# Patient Record
Sex: FEMALE | Race: WHITE
[De-identification: names, ages, dates, MRNs, and addresses within clinical notes are randomized per-mention and may not be internally consistent; named-entity substitution may affect disease eponyms.]

---

## 2017-03-14 NOTE — BD
EXAMINATION TYPE: MG DEXA axial skeleton.  

 

DATE OF EXAM: 3/14/2017 7:06 AM

 

CLINICAL HISTORY: Fracture of one rib per order.

 

Height:  66.5

Weight:  282

 

FRAX RISK QUESTIONS:

Alcohol (3 or more units per day):  no

Family History (Parent hip fracture):  no

Glucocorticoids (More than 3mos):  no

           (Ex: prednisone, prednisolone, methylprednisolone, dexamethasone, and hydrocortisone).    
     

History of Fracture in Adulthood: rib

Secondary Osteoporosis: 

  1.  Type 1 Diabetes: no

  2.  Hyperthyroidism: no

  3.  Menopause before 45: no

  4.  Malnutrition: no

  5.  Chronic liver disease: no

Rheumatoid Arthritis: no

Current Tobacco Use: no

 

RISK FACTORS 

HISTORY OF: 

History of Fracture: yes, rib 

When: unsure; recently detected on recent CT scan

Family History of Osteoporosis: yes, mother

Drink Alcohol: no

Active: yes

Diet low in dairy products/other sources of calcium:  somewhat

Postmenopausal woman: yes, hysterectomy age 45

Take estrogen and/or progesterone medications: no

Lost more than 2 inches in height since high school: no

Frequent falls: no

Poor Health: somewhat

Hyperparathyroidism: no

Adrenal Insufficiency: no

 

MEDICATIONS: 

Prednisone or other steroids: no

Thyroid Medications:  no

Osteoporosis Medications: no

Additional Medications: blood pressure meds, multi Vitamin 

Additional History: Ovarian CA, chemo 

body aches 

 

EXAM MEASUREMENTS: 

Bone mineral densitometry was performed using the [employer] System.

Bone mineral density as measured about the Lumbar spine is:  

----- L1-L4(G/cm2): 1.001

T Score Values are as follows:

----- L2: -2.2

----- L3: -0.7

----- L4: -1.8

----- L1-L4: -1.5

Bone mineral density BASELINE

 

Bone mineral density about the R hip (g/cm2): 1.103

Bone mineral density about the L hip (g/cm2): 1.055

T Score values are as follows:

-----R Neck: 0.5

-----L Neck: 0.1

-----R Intertrochanter: 1.1

-----L Intertrochanter: 0.7

Bone mineral density BASELINE

 

IMPRESSION:

Osteopenia (T Score between -2.5 and -1 as noted by T score values overall in the low back. There is 
slightly increased risk of fracture and the patient may be considered for treatment. Re-Screen 1-2 ye
ars.

 

NOTE:  T-SCORE=SD OF THE YOUNG ADULT MEAN.

## 2018-09-11 ENCOUNTER — HOSPITAL ENCOUNTER (OUTPATIENT)
Dept: HOSPITAL 47 - RADXRMAIN | Age: 48
Discharge: HOME | End: 2018-09-11
Payer: COMMERCIAL

## 2018-09-11 DIAGNOSIS — M79.604: Primary | ICD-10-CM

## 2018-09-11 DIAGNOSIS — M25.561: ICD-10-CM

## 2018-09-12 NOTE — XR
EXAMINATION TYPE: XR knee complete RT

 

DATE OF EXAM: 9/11/2018

 

CLINICAL HISTORY: pain

 

TECHNIQUE:  Three views of the right knee are obtained.

 

COMPARISON: None.

 

FINDINGS:  There is no acute fracture/dislocation.  The tri-compartment joint spaces appear within no
rmal limits.  The overlying soft tissue appears unremarkable.

 

IMPRESSION:  There is no acute fracture or dislocation.ICD 10 NO FRACTURE, INITIAL EVALUATION

## 2018-09-12 NOTE — XR
EXAMINATION TYPE: XR femur RT

 

DATE OF EXAM: 9/11/2018

 

CLINICAL HISTORY: pain

 

TECHNIQUE:  Two views of the right femur are obtained.

 

COMPARISON: None.

 

FINDINGS:  

There is no acute fracture or dislocation seen of the  femur.  The hip and knee joints appear within 
normal limits.  The overlying soft tissue appears unremarkable.

 

IMPRESSION:  

 

There is no acute fracture or dislocation seen of the femur.

 

ICD 10 NO FRACTURE, INITIAL EVALUATION

## 2018-09-12 NOTE — XR
EXAMINATION TYPE: XR tibia fibula RT

 

DATE OF EXAM: 9/11/2018

 

CLINICAL HISTORY: pain

 

TECHNIQUE:  AP and lateral images of the right tibia and fibula are obtained.

 

COMPARISON: None.

 

FINDINGS:  There is no acute fracture/dislocation evident. The joint spaces  appear within normal mccloud
its.  The overlying soft tissue appears unremarkable.

 

IMPRESSION:  

 

There is no acute fracture or dislocation seen.

 

ICD 10 NO FRACTURE, INITIAL EVALUATION

## 2018-09-24 ENCOUNTER — HOSPITAL ENCOUNTER (OUTPATIENT)
Dept: HOSPITAL 47 - RADMAMWWP | Age: 48
Discharge: HOME | End: 2018-09-24
Payer: COMMERCIAL

## 2018-09-24 DIAGNOSIS — Z15.01: ICD-10-CM

## 2018-09-24 DIAGNOSIS — Z12.31: Primary | ICD-10-CM

## 2018-09-24 PROCEDURE — 77066 DX MAMMO INCL CAD BI: CPT

## 2018-09-24 PROCEDURE — 77062 BREAST TOMOSYNTHESIS BI: CPT

## 2018-09-24 NOTE — MM
Reason for exam: additional evaluation requested from prior study.

Last mammogram was performed 1 year and 11 months ago.



History:

Patient is postmenopausal.

Family history of breast cancer in paternal grandmother.



Physical Findings:

Nurse did not find any significant physical abnormalities on exam.



MG 3D Diag Mammo W/Cad [facility]AMA

Bilateral CC and MLO view(s) were taken.

Prior study comparison: October 11, 2016, bilateral MG 3d diag mammo w/cad [facility].  

February 11, 2014, mammogram, performed at [facility].

The breast tissue is heterogeneously dense. This may lower the sensitivity of 

mammography.  There is no dominant lesion. Right mediport catheter axillary 

region.



These results were verbally communicated with the patient and result sheet given 

to the patient on 9/24/18.





ASSESSMENT: Benign, BI-RAD 2



RECOMMENDATION:

Routine screening mammogram of both breasts in 1 year.

## 2018-10-23 ENCOUNTER — HOSPITAL ENCOUNTER (OUTPATIENT)
Dept: HOSPITAL 47 - ORWHC2ENDO | Age: 48
Discharge: HOME | End: 2018-10-23
Attending: SURGERY
Payer: COMMERCIAL

## 2018-10-23 VITALS — DIASTOLIC BLOOD PRESSURE: 75 MMHG | SYSTOLIC BLOOD PRESSURE: 114 MMHG | RESPIRATION RATE: 16 BRPM | HEART RATE: 81 BPM

## 2018-10-23 VITALS — BODY MASS INDEX: 45.1 KG/M2

## 2018-10-23 VITALS — TEMPERATURE: 98.2 F

## 2018-10-23 DIAGNOSIS — Z88.5: ICD-10-CM

## 2018-10-23 DIAGNOSIS — Z79.899: ICD-10-CM

## 2018-10-23 DIAGNOSIS — Z88.6: ICD-10-CM

## 2018-10-23 DIAGNOSIS — Z87.891: ICD-10-CM

## 2018-10-23 DIAGNOSIS — M19.90: ICD-10-CM

## 2018-10-23 DIAGNOSIS — Z85.43: ICD-10-CM

## 2018-10-23 DIAGNOSIS — R18.8: ICD-10-CM

## 2018-10-23 DIAGNOSIS — K21.9: ICD-10-CM

## 2018-10-23 DIAGNOSIS — E66.9: ICD-10-CM

## 2018-10-23 DIAGNOSIS — Z90.49: ICD-10-CM

## 2018-10-23 DIAGNOSIS — Z12.11: Primary | ICD-10-CM

## 2018-10-23 DIAGNOSIS — G43.909: ICD-10-CM

## 2018-10-23 DIAGNOSIS — I10: ICD-10-CM

## 2018-10-23 PROCEDURE — 45378 DIAGNOSTIC COLONOSCOPY: CPT

## 2018-10-23 NOTE — P.OP
Date of Procedure: 10/23/18


Preoperative Diagnosis: 


Screening colonoscopy


Postoperative Diagnosis: 


Normal colon


Procedure(s) Performed: 


Colonoscopy


Anesthesia: MAC


Surgeon: Seferino Youngblood


Pathology: none sent


Condition: stable


Disposition: PACU


Description of Procedure: 





PROCEDURE:  The patient was placed on the endoscopy table in the lateral 

position.  Digital rectal examination was performed which revealed no 

abnormalities.  .  Flexible colonoscope was then placed in the patient's anus 

and passed throughout the entire colon.  The ileocecal valve was visualized.  

The cecum, ascending, transverse, descending and sigmoid colon were normal.  

The rectum was normal as well.  There were no masses, polyps or diverticula 

noted in the entire colon. 





SUMMARY OF FINDINGS:  


Normal colonoscopy.

## 2018-10-23 NOTE — P.GSHP
History of Present Illness


H&P Date: 10/23/18


Chief Complaint: Screening colonoscopy





This a 48-year-old female who presents today for screening colonoscopy.  She 

denies any significant GI complaints.





Past Medical History


Past Medical History: Cancer, GERD/Reflux, Hypertension, Osteoarthritis (OA)


Additional Past Medical History / Comment(s): ovarian cancer,MIGRAINE HEADACHE


History of Any Multi-Drug Resistant Organisms: None Reported


Past Surgical History: Appendectomy, Bowel Resection,  Section, 

Cholecystectomy, Hernia Repair, Hysterectomy


Additional Past Surgical History / Comment(s): Open appendectomy for perforated 

appendeX,


Past Anesthesia/Blood Transfusion Reactions: Previous Problems w/ Anesthesia, 

Postoperative Nausea & Vomiting (PONV)


Additional Past Anesthesia/Blood Transfusion Reaction / Comment(s): BLOOD 

TRANSFUSION WITH NO PROBLEM


Smoking Status: Former smoker





- Past Family History


  ** Father


Additional Family Medical History / Comment(s): gastric ulcers





  ** Mother


Family Medical History: Cancer


Additional Family Medical History / Comment(s): TONGUE AND THROAT CANCER





Medications and Allergies


 Home Medications











 Medication  Instructions  Recorded  Confirmed  Type


 


Multivit with Calcium,Iron,Min 1 tab PO DAILY 07/27/15 10/22/18 History





[Women's Daily Multivitamin]    


 


Carvedilol [Coreg] 25 mg PO BID 02/03/16 10/22/18 History


 


Ibuprofen [Motrin] 800 mg PO Q6HR PRN 10/22/18 10/22/18 History


 


Losartan/Hydrochlorothiazide 1 each PO DAILY 10/22/18 10/22/18 History





[Losartan-Hctz 50-12.5 mg Tab]    


 


Mirabegron [Myrbetriq] 25 mg PO HS 10/22/18 10/22/18 History


 


Venlafaxine HCl [Effexor XR] 150 mg PO DAILY 10/22/18 10/22/18 History











 Allergies











Allergy/AdvReac Type Severity Reaction Status Date / Time


 


acetaminophen [From Tylenol] AdvReac  SEVERE Verified 10/22/18 08:56





   HEADACHE  


 


hydrocodone [From [place]] AdvReac  Nausea & Verified 10/23/18 08:54





   Vomiting  














Surgical - Exam


 Vital Signs











Temp Pulse Resp BP Pulse Ox


 


 98.2 F   82   16   132/82   94 L


 


 10/23/18 08:52  10/23/18 08:52  10/23/18 08:52  10/23/18 08:52  10/23/18 08:52














- General


well developed, well nourished





- Eyes


PERRL





- ENT


normal pinna





- Neck


no masses





- Respiratory


normal expansion





- Cardiovascular


Rhythm: regular





- Abdomen


Abdomen: soft, non tender





Assessment and Plan


Assessment: 





We'll perform screening colonoscopy.

## 2018-12-28 ENCOUNTER — HOSPITAL ENCOUNTER (EMERGENCY)
Dept: HOSPITAL 47 - EC | Age: 48
Discharge: HOME | End: 2018-12-28
Payer: COMMERCIAL

## 2018-12-28 VITALS
RESPIRATION RATE: 18 BRPM | HEART RATE: 98 BPM | TEMPERATURE: 97.8 F | SYSTOLIC BLOOD PRESSURE: 136 MMHG | DIASTOLIC BLOOD PRESSURE: 79 MMHG

## 2018-12-28 DIAGNOSIS — Z88.5: ICD-10-CM

## 2018-12-28 DIAGNOSIS — Z85.43: ICD-10-CM

## 2018-12-28 DIAGNOSIS — M54.9: ICD-10-CM

## 2018-12-28 DIAGNOSIS — K59.00: ICD-10-CM

## 2018-12-28 DIAGNOSIS — I10: ICD-10-CM

## 2018-12-28 DIAGNOSIS — F32.9: ICD-10-CM

## 2018-12-28 DIAGNOSIS — R10.84: Primary | ICD-10-CM

## 2018-12-28 DIAGNOSIS — Z83.79: ICD-10-CM

## 2018-12-28 DIAGNOSIS — Z88.6: ICD-10-CM

## 2018-12-28 DIAGNOSIS — Z90.49: ICD-10-CM

## 2018-12-28 DIAGNOSIS — Z79.899: ICD-10-CM

## 2018-12-28 DIAGNOSIS — R11.2: ICD-10-CM

## 2018-12-28 DIAGNOSIS — Z87.891: ICD-10-CM

## 2018-12-28 LAB
ALBUMIN SERPL-MCNC: 4.1 G/DL (ref 3.5–5)
ALP SERPL-CCNC: 131 U/L (ref 38–126)
ALT SERPL-CCNC: 34 U/L (ref 9–52)
AMYLASE SERPL-CCNC: 44 U/L (ref 30–110)
ANION GAP SERPL CALC-SCNC: 12 MMOL/L
AST SERPL-CCNC: 32 U/L (ref 14–36)
BASOPHILS # BLD AUTO: 0 K/UL (ref 0–0.2)
BASOPHILS NFR BLD AUTO: 0 %
BUN SERPL-SCNC: 26 MG/DL (ref 7–17)
CALCIUM SPEC-MCNC: 10.6 MG/DL (ref 8.4–10.2)
CHLORIDE SERPL-SCNC: 103 MMOL/L (ref 98–107)
CO2 SERPL-SCNC: 24 MMOL/L (ref 22–30)
EOSINOPHIL # BLD AUTO: 0.3 K/UL (ref 0–0.7)
EOSINOPHIL NFR BLD AUTO: 3 %
ERYTHROCYTE [DISTWIDTH] IN BLOOD BY AUTOMATED COUNT: 4.77 M/UL (ref 3.8–5.4)
ERYTHROCYTE [DISTWIDTH] IN BLOOD: 16.2 % (ref 11.5–15.5)
GLUCOSE SERPL-MCNC: 159 MG/DL (ref 74–99)
HCT VFR BLD AUTO: 47.4 % (ref 34–46)
HGB BLD-MCNC: 15 GM/DL (ref 11.4–16)
LIPASE SERPL-CCNC: 72 U/L (ref 23–300)
LYMPHOCYTES # SPEC AUTO: 1.2 K/UL (ref 1–4.8)
LYMPHOCYTES NFR SPEC AUTO: 12 %
MCH RBC QN AUTO: 31.5 PG (ref 25–35)
MCHC RBC AUTO-ENTMCNC: 31.7 G/DL (ref 31–37)
MCV RBC AUTO: 99.3 FL (ref 80–100)
MONOCYTES # BLD AUTO: 0.6 K/UL (ref 0–1)
MONOCYTES NFR BLD AUTO: 6 %
NEUTROPHILS # BLD AUTO: 7.9 K/UL (ref 1.3–7.7)
NEUTROPHILS NFR BLD AUTO: 79 %
PLATELET # BLD AUTO: 161 K/UL (ref 150–450)
POTASSIUM SERPL-SCNC: 4.1 MMOL/L (ref 3.5–5.1)
PROT SERPL-MCNC: 7.1 G/DL (ref 6.3–8.2)
SODIUM SERPL-SCNC: 139 MMOL/L (ref 137–145)
WBC # BLD AUTO: 10 K/UL (ref 3.8–10.6)

## 2018-12-28 PROCEDURE — 85025 COMPLETE CBC W/AUTO DIFF WBC: CPT

## 2018-12-28 PROCEDURE — 99284 EMERGENCY DEPT VISIT MOD MDM: CPT

## 2018-12-28 PROCEDURE — 96375 TX/PRO/DX INJ NEW DRUG ADDON: CPT

## 2018-12-28 PROCEDURE — 80053 COMPREHEN METABOLIC PANEL: CPT

## 2018-12-28 PROCEDURE — 83690 ASSAY OF LIPASE: CPT

## 2018-12-28 PROCEDURE — 36415 COLL VENOUS BLD VENIPUNCTURE: CPT

## 2018-12-28 PROCEDURE — 96374 THER/PROPH/DIAG INJ IV PUSH: CPT

## 2018-12-28 PROCEDURE — 82150 ASSAY OF AMYLASE: CPT

## 2018-12-28 PROCEDURE — 96376 TX/PRO/DX INJ SAME DRUG ADON: CPT

## 2018-12-28 NOTE — ED
Abdominal Pain HPI





- General


Chief Complaint: Abdominal Pain


Stated Complaint: nausea, vomiting, constipation


Time Seen by Provider: 18 01:41


Source: patient


Mode of arrival: ambulatory


Limitations: no limitations





- History of Present Illness


Initial Comments: 





This patient is 48-year-old woman who presents to be evaluated for abdominal 

pain.  Patient states she has been getting this every few weeks, in association 

with her Avastin therapy.  Patient states that she'll get diffuse abdominal 

cramping, and a feeling like she is constipated.  She is only to pass some 

small hard stools.  She will then also have nausea and vomiting.  She states 

the pain seems to come in waves.  Cramping in character.  It is moderate 

intensity.  She has not found any worsening or relieving factors.  At times she 

feels in her back.


MD Complaint: abdominal pain


Onset/Timin


-: days(s)


Location: diffuse


Radiation: back


Migration to: periumbilical


Severity: moderate


Quality: cramping


Consistency: colicky


Improves With: nothing


Worsens With: nothing


Associated Symptoms: nausea, vomiting, constipation





- Related Data


 Home Medications











 Medication  Instructions  Recorded  Confirmed


 


Multivit with Calcium,Iron,Min 1 tab PO DAILY 07/27/15 10/22/18





[Women's Daily Multivitamin]   


 


Carvedilol [Coreg] 25 mg PO BID 02/03/16 10/22/18


 


Ibuprofen [Motrin] 800 mg PO Q6HR PRN 10/22/18 10/22/18


 


Losartan/Hydrochlorothiazide 1 each PO DAILY 10/22/18 10/22/18





[Losartan-Hctz 50-12.5 mg Tab]   


 


Mirabegron [Myrbetriq] 25 mg PO HS 10/22/18 10/22/18


 


Venlafaxine HCl [Effexor XR] 150 mg PO DAILY 10/22/18 10/22/18











 Allergies











Allergy/AdvReac Type Severity Reaction Status Date / Time


 


acetaminophen [From Tylenol] AdvReac  SEVERE Verified 18 00:32





   HEADACHE  


 


hydrocodone [From [place]] AdvReac  Nausea & Verified 18 00:32





   Vomiting  














Review of Systems


ROS Statement: 


Those systems with pertinent positive or pertinent negative responses have been 

documented in the HPI.





ROS Other: All systems not noted in ROS Statement are negative.


Constitutional: Denies: fever, chills, weakness


Respiratory: Denies: cough, dyspnea


Cardiovascular: Denies: chest pain, palpitations


Gastrointestinal: Reports: as per HPI, abdominal pain, nausea, constipation.  

Denies: diarrhea, melena, hematochezia


Genitourinary: Denies: dysuria, hematuria


Musculoskeletal: Reports: as per HPI, back pain


Skin: Denies: rash


Neurological: Denies: headache, weakness, numbness


Psychiatric: Reports: anxiety





Past Medical History


Past Medical History: Cancer, GERD/Reflux, Hypertension, Osteoarthritis (OA)


Additional Past Medical History / Comment(s): ovarian cancer,MIGRAINE HEADACHE


History of Any Multi-Drug Resistant Organisms: None Reported


Past Surgical History: Appendectomy, Bowel Resection,  Section, 

Cholecystectomy, Hernia Repair, Hysterectomy


Additional Past Surgical History / Comment(s): Open appendectomy for perforated 

appendeX,


Past Anesthesia/Blood Transfusion Reactions: Previous Problems w/ Anesthesia, 

Postoperative Nausea & Vomiting (PONV)


Additional Past Anesthesia/Blood Transfusion Reaction / Comment(s): BLOOD 

TRANSFUSION WITH NO PROBLEM


Past Psychological History: Depression


Smoking Status: Former smoker





- Past Family History


  ** Father


Additional Family Medical History / Comment(s): gastric ulcers





  ** Mother


Family Medical History: Cancer


Additional Family Medical History / Comment(s): TONGUE AND THROAT CANCER





General Exam


Limitations: no limitations


General appearance: alert, in no apparent distress, obese


Head exam: Present: atraumatic


Eye exam: Present: normal appearance.  Absent: scleral icterus, conjunctival 

injection


ENT exam: Present: normal oropharynx


Respiratory exam: Present: normal lung sounds bilaterally.  Absent: respiratory 

distress, wheezes, rales, rhonchi, stridor


Cardiovascular Exam: Present: regular rate, normal rhythm, normal heart sounds.

  Absent: systolic murmur, diastolic murmur, rubs, gallop


GI/Abdominal exam: Present: soft, normal bowel sounds.  Absent: distended, 

tenderness, guarding, rebound, rigid, mass, pulsatile mass, hernia


Extremities exam: Present: normal inspection, normal capillary refill.  Absent: 

pedal edema, calf tenderness


Back exam: Present: normal inspection.  Absent: CVA tenderness (R), CVA 

tenderness (L)


Neurological exam: Present: alert


Skin exam: Present: warm, dry, intact, normal color.  Absent: rash





Course


 Vital Signs











  18





  00:26 04:14


 


Temperature 97.3 F L 


 


Pulse Rate 73 68


 


Respiratory 20 16





Rate  


 


Blood Pressure 159/114 175/107


 


O2 Sat by Pulse 97 94 L





Oximetry  














Medical Decision Making





- Medical Decision Making





Patient's 48-year-old woman with abdominal complaints going on for about 24 

hours.  She had resolution of all her symptoms with antiemetic and analgesic.  

She did request that some IV fluids that she had not been keeping fluids down, 

and then following that she was feeling well and wanted to go home.  Discussed 

return parameters and appropriate follow-up.





- Lab Data


Result diagrams: 


 18 02:14





 18 02:14


 Lab Results











  18 Range/Units





  02:14 02:14 


 


WBC   10.0  (3.8-10.6)  k/uL


 


RBC   4.77  (3.80-5.40)  m/uL


 


Hgb   15.0  (11.4-16.0)  gm/dL


 


Hct   47.4 H  (34.0-46.0)  %


 


MCV   99.3  (80.0-100.0)  fL


 


MCH   31.5  (25.0-35.0)  pg


 


MCHC   31.7  (31.0-37.0)  g/dL


 


RDW   16.2 H  (11.5-15.5)  %


 


Plt Count   161  (150-450)  k/uL


 


Neutrophils %   79  %


 


Lymphocytes %   12  %


 


Monocytes %   6  %


 


Eosinophils %   3  %


 


Basophils %   0  %


 


Neutrophils #   7.9 H  (1.3-7.7)  k/uL


 


Lymphocytes #   1.2  (1.0-4.8)  k/uL


 


Monocytes #   0.6  (0-1.0)  k/uL


 


Eosinophils #   0.3  (0-0.7)  k/uL


 


Basophils #   0.0  (0-0.2)  k/uL


 


Anisocytosis   Slight  


 


Macrocytosis   Slight  


 


Sodium  139   (137-145)  mmol/L


 


Potassium  4.1   (3.5-5.1)  mmol/L


 


Chloride  103   ()  mmol/L


 


Carbon Dioxide  24   (22-30)  mmol/L


 


Anion Gap  12   mmol/L


 


BUN  26 H   (7-17)  mg/dL


 


Creatinine  0.96   (0.52-1.04)  mg/dL


 


Est GFR (CKD-EPI)AfAm  80   (>60 ml/min/1.73 sqM)  


 


Est GFR (CKD-EPI)NonAf  69   (>60 ml/min/1.73 sqM)  


 


Glucose  159 H   (74-99)  mg/dL


 


Calcium  10.6 H   (8.4-10.2)  mg/dL


 


Total Bilirubin  0.7   (0.2-1.3)  mg/dL


 


AST  32   (14-36)  U/L


 


ALT  34   (9-52)  U/L


 


Alkaline Phosphatase  131 H   ()  U/L


 


Total Protein  7.1   (6.3-8.2)  g/dL


 


Albumin  4.1   (3.5-5.0)  g/dL


 


Amylase  44   ()  U/L


 


Lipase  72   ()  U/L














- EKG Data


-: EKG Interpreted by Me


EKG shows normal: sinus rhythm, axis (Normal), intervals (Normal), QRS 

complexes (Normal), ST-T waves (There are anterolateral ST depressions.)


Rate: normal (Rate 60 bpm)


When compared to previous EKG there are: other (There is a comparison ECG from 

2015 which does show T inversions in V2 through 4.  There is additional T 

inversion in V5 is uncertain whether this is due to lead placement.)





Disposition


Clinical Impression: 


 Abdominal pain





Disposition: HOME SELF-CARE


Condition: Good


Instructions:  Abdominal Pain (ED)


Is patient prescribed a controlled substance at d/c from ED?: No


Referrals: 


TARIQ Kennedy III, MD [Primary Care Provider] - 1-2 days